# Patient Record
Sex: MALE | Race: WHITE | NOT HISPANIC OR LATINO | Employment: OTHER | URBAN - METROPOLITAN AREA
[De-identification: names, ages, dates, MRNs, and addresses within clinical notes are randomized per-mention and may not be internally consistent; named-entity substitution may affect disease eponyms.]

---

## 2017-01-02 ENCOUNTER — HOSPITAL ENCOUNTER (OUTPATIENT)
Dept: SPEECH THERAPY | Facility: CLINIC | Age: 67
Setting detail: THERAPIES SERIES
End: 2017-01-02
Attending: PHYSICIAN ASSISTANT
Payer: MEDICARE

## 2017-01-02 PROCEDURE — 40000211 ZZHC STATISTIC SLP  DEPARTMENT VISIT: Performed by: SPEECH-LANGUAGE PATHOLOGIST

## 2017-01-02 PROCEDURE — 97532 ZZHC SP COGNITIVE SKILLS EA 15 MIN: CPT | Mod: GN | Performed by: SPEECH-LANGUAGE PATHOLOGIST

## 2017-01-06 ENCOUNTER — HOSPITAL ENCOUNTER (OUTPATIENT)
Dept: OCCUPATIONAL THERAPY | Facility: CLINIC | Age: 67
Setting detail: THERAPIES SERIES
End: 2017-01-06
Attending: PHYSICIAN ASSISTANT
Payer: MEDICARE

## 2017-01-06 PROCEDURE — 97530 THERAPEUTIC ACTIVITIES: CPT | Mod: GO | Performed by: OCCUPATIONAL THERAPIST

## 2017-01-06 PROCEDURE — 40000125 ZZHC STATISTIC OT OUTPT VISIT: Performed by: OCCUPATIONAL THERAPIST

## 2017-01-09 ENCOUNTER — HOSPITAL ENCOUNTER (OUTPATIENT)
Dept: OCCUPATIONAL THERAPY | Facility: CLINIC | Age: 67
Setting detail: THERAPIES SERIES
End: 2017-01-09
Attending: PHYSICIAN ASSISTANT
Payer: MEDICARE

## 2017-01-09 ENCOUNTER — HOSPITAL ENCOUNTER (OUTPATIENT)
Dept: SPEECH THERAPY | Facility: CLINIC | Age: 67
Setting detail: THERAPIES SERIES
End: 2017-01-09
Attending: PHYSICIAN ASSISTANT
Payer: MEDICARE

## 2017-01-09 PROCEDURE — 97530 THERAPEUTIC ACTIVITIES: CPT | Mod: GO | Performed by: OCCUPATIONAL THERAPIST

## 2017-01-09 PROCEDURE — 40000125 ZZHC STATISTIC OT OUTPT VISIT: Performed by: OCCUPATIONAL THERAPIST

## 2017-01-09 PROCEDURE — 40000211 ZZHC STATISTIC SLP  DEPARTMENT VISIT: Performed by: SPEECH-LANGUAGE PATHOLOGIST

## 2017-01-09 PROCEDURE — 97532 ZZHC SP COGNITIVE SKILLS EA 15 MIN: CPT | Mod: GN,59 | Performed by: SPEECH-LANGUAGE PATHOLOGIST

## 2017-01-11 ENCOUNTER — HOSPITAL ENCOUNTER (OUTPATIENT)
Dept: SPEECH THERAPY | Facility: CLINIC | Age: 67
Setting detail: THERAPIES SERIES
End: 2017-01-11
Attending: PHYSICIAN ASSISTANT
Payer: MEDICARE

## 2017-01-11 PROCEDURE — 40000211 ZZHC STATISTIC SLP  DEPARTMENT VISIT: Performed by: SPEECH-LANGUAGE PATHOLOGIST

## 2017-01-11 PROCEDURE — 97532 ZZHC SP COGNITIVE SKILLS EA 15 MIN: CPT | Mod: GN | Performed by: SPEECH-LANGUAGE PATHOLOGIST

## 2017-01-16 ENCOUNTER — HOSPITAL ENCOUNTER (OUTPATIENT)
Dept: SPEECH THERAPY | Facility: CLINIC | Age: 67
Setting detail: THERAPIES SERIES
End: 2017-01-16
Attending: PHYSICIAN ASSISTANT
Payer: MEDICARE

## 2017-01-16 PROCEDURE — 97532 ZZHC SP COGNITIVE SKILLS EA 15 MIN: CPT | Mod: GN | Performed by: SPEECH-LANGUAGE PATHOLOGIST

## 2017-01-16 PROCEDURE — 40000211 ZZHC STATISTIC SLP  DEPARTMENT VISIT: Performed by: SPEECH-LANGUAGE PATHOLOGIST

## 2017-01-17 ENCOUNTER — HOSPITAL ENCOUNTER (OUTPATIENT)
Dept: OCCUPATIONAL THERAPY | Facility: CLINIC | Age: 67
Setting detail: THERAPIES SERIES
End: 2017-01-17
Attending: PHYSICIAN ASSISTANT
Payer: MEDICARE

## 2017-01-17 ENCOUNTER — HOSPITAL ENCOUNTER (OUTPATIENT)
Dept: SPEECH THERAPY | Facility: CLINIC | Age: 67
Setting detail: THERAPIES SERIES
End: 2017-01-17
Attending: PHYSICIAN ASSISTANT
Payer: MEDICARE

## 2017-01-17 PROCEDURE — 40000125 ZZHC STATISTIC OT OUTPT VISIT: Performed by: OCCUPATIONAL THERAPIST

## 2017-01-17 PROCEDURE — 40000211 ZZHC STATISTIC SLP  DEPARTMENT VISIT: Performed by: SPEECH-LANGUAGE PATHOLOGIST

## 2017-01-17 PROCEDURE — 97535 SELF CARE MNGMENT TRAINING: CPT | Mod: GO | Performed by: OCCUPATIONAL THERAPIST

## 2017-01-17 PROCEDURE — 97532 ZZHC SP COGNITIVE SKILLS EA 15 MIN: CPT | Mod: GN | Performed by: SPEECH-LANGUAGE PATHOLOGIST

## 2017-01-17 PROCEDURE — 97112 NEUROMUSCULAR REEDUCATION: CPT | Mod: GO | Performed by: OCCUPATIONAL THERAPIST

## 2017-01-23 ENCOUNTER — HOSPITAL ENCOUNTER (OUTPATIENT)
Dept: SPEECH THERAPY | Facility: CLINIC | Age: 67
Setting detail: THERAPIES SERIES
End: 2017-01-23
Attending: PHYSICIAN ASSISTANT
Payer: MEDICARE

## 2017-01-23 PROCEDURE — 97532 ZZHC SP COGNITIVE SKILLS EA 15 MIN: CPT | Mod: GN | Performed by: SPEECH-LANGUAGE PATHOLOGIST

## 2017-01-23 PROCEDURE — 40000211 ZZHC STATISTIC SLP  DEPARTMENT VISIT: Performed by: SPEECH-LANGUAGE PATHOLOGIST

## 2017-01-23 NOTE — ADDENDUM NOTE
Encounter addended by: Shania Miguel OTR on: 1/23/2017  9:45 AM<BR>     Documentation filed: Clinical Notes, Inpatient Document Flowsheet

## 2017-01-23 NOTE — PROGRESS NOTES
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  Loses track of which step he is on. during interruptions, hard to think his way through prior known sequences for processing questions with customers. At time has cognitive effort reaching 8/10. Patient reports his handwriting legibility hasn't ever been great, but it is improved since the onset of the stroke.      Objective Measurements:     Objective Measure: Handwriting Speed and legibility     Details: getting 109 characters in 2 min sample, for 55 characters per minute (decreased from 76 wpm previously).   Improved legibility and improved legibility compared to last sample and thus, patient may have taken more time to be more accurate with this sample      Objective Measure: CAM/Errands   Details: Re-tested select areas: patient scored WFL with multiple digit math skills and complex concrete problem solving skills (both improved from MODERATE deficits, problem solving task required 1 general cue - then able to correct which still allows WFLs for scoring).     Completes tasks in 17 minutes, 3 min less than estimate; Does well with recalling details inside of task list, tracking/checking off items as they are completed, seeing details of original vs copy management, movies times/location using new iPad helen, and applied use of  timer on cell phone to recall of start time to plan for time based meeting. Pt threw away his checklist copy in the pediatric gym (missed pathfinding for waiting room, impulsive, walked quickly.) Had good structure for sequence, grouped items by location zone, and used direct order 1-11.   Pt able to reflect on his single error and his need to slow down during unfamiliar tasks and ask clarifying questions as needed.           Objective Measure: B FMC skills via 9HPT   Details: R: 28, 25 sec. (was 28 and 24 sec. at evaluation); L: 22 sec. (was 29 and 28 seconds at evaluation)        Goals:     Goal Identifier FMC   Goal Description Patient will demonstrate increased  B hand FMC (by 5 seconds) each for increased ease, accuracy with ADL/IADLs such as keyboard entry, handwriting legibility, opening containers, pand in hand shifting of coins from palm to finger tip. (1/17: met for L hand, not for R hand)   Target Date 02/26/17   Date Met      Progress:Per above, goal continued     Goal Identifier Handwriting/Keyboarding   Goal Description Pt will demonstrate increased written legibility to 90% and fluency to 80 characters per minute to support family and vocational communication for 10 minute intervals, <3/10 effort.   Target Date 02/26/17   Date Met      Progress: Per above, goal continued.     Goal Identifier Visual   Goal Description Pt will demonstrate improved visual efficiency with scanning and tolerance of reading tasks to one hour interval with <3/10 effort.   Target Date 02/26/17   Date Met      Progress:  Pt tolerating 2 hour intervals of work with 6-8/10 cognitive effort, visual effort 3/10.     Goal Identifier CAM, IADL   Goal Description Patient and family to verbalize understanding of  IADL screening, Cognitive Assessment of MN results and identify 3 strategies to increase patient's efficiency and safety in the home and community settings.   Target Date 02/26/17   Date Met   1/17/17   Progress: GOAL MET      Goal Identifier Errands   Goal Description Pt to demonstrate improved executive functioning by gathering a series of 7-10 errand tasks into sequence, planning order by space, energy conservation, temporal completion and consideration of how steps affect each other, with 90% accuracy for sequencing and follow through.   Target Date 02/26/17   Date Met      Progress: Per above, Errands/CAM       Goal Identifier GOAL ADDED--  Moderate problem solving, executive functioning    Goal Description Patient will demonstrate the ability to complete divided attention during alternating tasks of moderately complex money and office management tasks with 90% accuracy for increased attention to detail and problem solving skills to resume finances at home and manage money in the community.   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  Pt has difficulty with shifting attention between tasks and between mathematic and office functions if completing manually. 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 Completing 10-12 step errands task is challenging for him due to shifting between processes and considering between variables during task completion.  Adapted well using timer and movie search apps on his phone.    Plan:  Continue therapy per current plan of care.    Discharge:  No

## 2017-01-23 NOTE — ADDENDUM NOTE
Encounter addended by: Shania Miguel OTR on: 1/23/2017  5:10 PM<BR>     Documentation filed: Notes Section

## 2017-01-24 ENCOUNTER — HOSPITAL ENCOUNTER (OUTPATIENT)
Dept: OCCUPATIONAL THERAPY | Facility: CLINIC | Age: 67
Setting detail: THERAPIES SERIES
End: 2017-01-24
Attending: PHYSICIAN ASSISTANT
Payer: MEDICARE

## 2017-01-24 PROCEDURE — 97530 THERAPEUTIC ACTIVITIES: CPT | Mod: GO | Performed by: OCCUPATIONAL THERAPIST

## 2017-01-24 PROCEDURE — 40000125 ZZHC STATISTIC OT OUTPT VISIT: Performed by: OCCUPATIONAL THERAPIST

## 2017-01-25 ENCOUNTER — HOSPITAL ENCOUNTER (OUTPATIENT)
Dept: SPEECH THERAPY | Facility: CLINIC | Age: 67
Setting detail: THERAPIES SERIES
End: 2017-01-25
Attending: PHYSICIAN ASSISTANT
Payer: MEDICARE

## 2017-01-25 PROCEDURE — 97532 ZZHC SP COGNITIVE SKILLS EA 15 MIN: CPT | Mod: GN | Performed by: SPEECH-LANGUAGE PATHOLOGIST

## 2017-01-25 PROCEDURE — 40000211 ZZHC STATISTIC SLP  DEPARTMENT VISIT: Performed by: SPEECH-LANGUAGE PATHOLOGIST

## 2017-01-30 ENCOUNTER — HOSPITAL ENCOUNTER (OUTPATIENT)
Dept: SPEECH THERAPY | Facility: CLINIC | Age: 67
Setting detail: THERAPIES SERIES
End: 2017-01-30
Attending: PHYSICIAN ASSISTANT
Payer: MEDICARE

## 2017-01-30 PROCEDURE — 40000211 ZZHC STATISTIC SLP  DEPARTMENT VISIT: Performed by: SPEECH-LANGUAGE PATHOLOGIST

## 2017-01-30 PROCEDURE — 97532 ZZHC SP COGNITIVE SKILLS EA 15 MIN: CPT | Mod: GN | Performed by: SPEECH-LANGUAGE PATHOLOGIST

## 2017-02-01 ENCOUNTER — HOSPITAL ENCOUNTER (OUTPATIENT)
Dept: SPEECH THERAPY | Facility: CLINIC | Age: 67
Setting detail: THERAPIES SERIES
End: 2017-02-01
Attending: PHYSICIAN ASSISTANT
Payer: MEDICARE

## 2017-02-01 PROCEDURE — 97532 ZZHC SP COGNITIVE SKILLS EA 15 MIN: CPT | Mod: GN | Performed by: SPEECH-LANGUAGE PATHOLOGIST

## 2017-02-01 PROCEDURE — G9162 LANG EXPRESS CURRENT STATUS: HCPCS | Mod: GN,CJ | Performed by: SPEECH-LANGUAGE PATHOLOGIST

## 2017-02-01 PROCEDURE — 40000211 ZZHC STATISTIC SLP  DEPARTMENT VISIT: Performed by: SPEECH-LANGUAGE PATHOLOGIST

## 2017-02-01 PROCEDURE — G9163 LANG EXPRESS GOAL STATUS: HCPCS | Mod: GN,CI | Performed by: SPEECH-LANGUAGE PATHOLOGIST

## 2017-02-01 NOTE — PROGRESS NOTES
Outpatient Speech Language Pathology Progress Note/Medicare 10th Visit Note      Patient: Bert Ngo  : 1950    Beginning/End Dates of Reporting Period:   to 2017    Referring Provider: Dr. Chuy Moore MD    Therapy Diagnosis: Moderate expressive/receptive aphasia. Moderate cognitive-linguistic impairments.     Client Self Report: Mr. Ngo is a 66 y.o. Male with a medical history significant for at Lt MCA stroke and Rt infarct occuring on 2016. Please see the initial speech-language evaluation dated 2016 for further medical history. At this time Mr. Ngo has completed 9 treatment sessions focused on cognitive-linguistic impairments. SLP suspects expressive/receptive aphasia worsened as a results of his attention ane memory deficits, thus, treatment sessions have directly targeted cognitive-linguistic skills.     Objective Measurements:           Goals:  Goal Identifier LTG   Goal Description Patient will improve his cognitive-linguistic function for daily needs at home and in the work place with use of compensatory strategies as neded and at least 90% accuracy.    Target Date 17   Date Met      Progress: Goal not met. Pt continues to demonstrate a need for use of strategies, currently achieving 80-85% accuracy.  SLP recommends follow up check in session to assess skill function following 1 month break without direct intervention.      Goal Identifier STG: Cognition/Memory strategies   Goal Description Patient will verbalized and demonstrate understanding of recommended memory strategies for use at home and in the occupational setting with 80% accuracy.    Target Date 17   Date Met   2017   Progress:Goal met.      Goal Identifier STG: Cognition/Memory   Goal Description Patient will recall verbal and non-verbal information needed for daily living and occpational needs presented immediately and after a delay of at least 10 minutes with 85% accuracy and  moderate cues.   Target Date 02/07/17   Date Met   2/1/2017   Progress:Goal met.      Goal Identifier STG: Cognition/Reasoning   Goal Description Patient will complete moderate level verbal and non-verbal reasoning including deduction, logic and organization needed for daily living and occupational communication needs with 85% accuracy and moderate assistance.    Target Date 02/07/17   Date Met   2/1/2017   Progress:Goal met.      Goal Identifier STG:  Attention/Concentration   Goal Description Patient will complete demonstrate sustained and divided attention for daily living and occupational needs and 80% accuracy provided minimal cues.    Target Date 02/07/17   Date Met  2/1/2017   Progress:Goal met.      **Language goals not directly targeted due to influence of cognitive-linguistic impairments on expressive and receptive language skills.     Goal Identifier LTG   Goal Description Patient will improve his expressive and receptive language for daily and occupational needs at home and in the communicty with use of compensatory strategies as needed and at least 95% accuracy.    Target Date 03/02/17   Date Met      Progress: Goal not met. Pt is currently achieving 80-90% with use of compensatory strategies provided cueing. SLP recommends follow up check in session to assess skill function following 1 month break without direct intervention.      Goal Identifier STG1: Language/Verbal Expression    Goal Description Patient will complete complex level verbal expression tasks for naming, describing and defining needed for daily communication with 85% accuracy and moderate assistance.    Target Date 02/07/17   Date Met   2/1/2017   Progress:Goal met.      Goal Identifier STG2: Language/ Reading Comprehension   Goal Description Patient will complete complex level readign comprehension tasks for improved comprehension of written inormation needed for daily living and emergency needs and 90% accuracy provided minimal  assistance.    Target Date 02/07/17   Date Met   2/1/2017   Progress:Goal met.      Goal Identifier STG3: Language/Auditory comprehension   Goal Description Patient will complete complex level auditory comprehension tasks for improved comprehension of auditory information needed for daily living and occupational needs and 90% accuracy provided minimal assitance.    Target Date 02/07/17   Date Met   2/1/2017   Progress:Goal met.      Goal Identifier STG4: Langauge/ Written Expression    Goal Description Patient will complete complex level written expression tasks for improved communication for daily and occupational needs and 90% accuracy provided minimal cues.    Target Date 02/07/17   Date Met   2/1/2017   Progress:Goal met.      Goal Identifier STG-Home Exercise Program (HEP)   Goal Description Patient will complete a home exercise program geared towards these above goals with 90% accuracy provided 0-min cues from family and friends.    Target Date  (Ongoing)   Date Met      Progress:     Progress Toward Goals:    Progress this reporting period: Mr. Ngo has met all of his short term goals during this treatment period. SLP recommends Mr. Ngo return for 1 follow up treatment session in 1 month to ensure skill maintenance and generalization to his occupational setting.     Plan:  Continue therapy per current plan of care.     Discharge:  No    Thank you for referring Bert Ngo to outpatient therapy at [facility] ([place] in [place].  Please call Angie Andrea MA, SLP-CCC at [phone] or email [email or web address] with any questions or concerns.      Angie Andrea M.A., SLP-CCC  Speech-Language Pathologist

## 2017-02-06 ENCOUNTER — HOSPITAL ENCOUNTER (OUTPATIENT)
Dept: OCCUPATIONAL THERAPY | Facility: CLINIC | Age: 67
Setting detail: THERAPIES SERIES
End: 2017-02-06
Attending: PHYSICIAN ASSISTANT
Payer: MEDICARE

## 2017-02-06 PROCEDURE — 40000125 ZZHC STATISTIC OT OUTPT VISIT: Performed by: OCCUPATIONAL THERAPIST

## 2017-02-06 PROCEDURE — 97530 THERAPEUTIC ACTIVITIES: CPT | Mod: GO | Performed by: OCCUPATIONAL THERAPIST

## 2017-03-01 NOTE — PROGRESS NOTES
[facility]      OUTPATIENT SPEECH LANGUAGE PATHOLOGY  PLAN OF TREATMENT FOR OUTPATIENT REHABILITATION    Patient's Last Name, First Name, M.I.                YOB: 1950  Bert Ngo                        Provider's Name  [facility] Medical Record No.  [id number]                               Onset Date: 11/7/2016   Start of Care Date: 12/2/2016   Type:     ___PT   ___OT   _X_SLP Medical Diagnosis: Left MCA CVA and right infarct.                       SLP Diagnosis: Moderate expressive/receptive aphasia. Moderate cognitive-linguistic impairments      _________________________________________________________________________________  Plan of Treatment:    Frequency/Duration: 1/week 6-8 weeks pending progress.      Goals:  Goal Identifier LTG   Goal Description Patient will improve his cognitive-linguistic function for daily needs at home and in the work place with use of compensatory strategies as neded and at least 90% accuracy.    Target Date 03/02/17   Date Met      Progress:     Goal Identifier STG: Cognition/Memory strategies   Goal Description Patient will verbalized and demonstrate understanding of recommended memory strategies for use at home and in the occupational setting with 80% accuracy.    Target Date 02/07/17   Date Met      Progress:     Goal Identifier STG: Cognition/Memory   Goal Description Patient will recall verbal and non-verbal information needed for daily living and occpational needs presented immediately and after a delay of at least 10 minutes with 85% accuracy and moderate cues.   Target Date 02/07/17   Date Met      Progress:     Goal Identifier STG: Cognition/Reasoning   Goal Description Patient will complete moderate level verbal and non-verbal reasoning including deduction, logic and organization needed for daily living and occupational  communication needs with 85% accuracy and moderate assistance.    Target Date 02/07/17   Date Met      Progress:     Goal Identifier STG:  Attention/Concentration   Goal Description Patient will complete demonstrate sustained and divided attention for daily living and occupational needs and 80% accuracy provided minimal cues.    Target Date 02/07/17   Date Met      Progress:     Goal Identifier LTG   Goal Description Patient will improve his expressive and receptive language for daily and occupational needs at home and in the communicty with use of compensatory strategies as needed and at least 95% accuracy.    Target Date 03/02/17   Date Met      Progress:     Goal Identifier STG1: Language/Verbal Expression    Goal Description Patient will complete complex level verbal expression tasks for naming, describing and defining needed for daily communication with 85% accuracy and moderate assistance.    Target Date 02/07/17   Date Met      Progress:     Goal Identifier STG2: Language/ Reading Comprehension   Goal Description Patient will complete complex level readign comprehension tasks for improved comprehension of written inormation needed for daily living and emergency needs and 90% accuracy provided minimal assistance.    Target Date 02/07/17   Date Met      Progress:     Goal Identifier STG3: Language/Auditory comprehension   Goal Description Patient will complete complex level auditory comprehension tasks for improved comprehension of auditory information needed for daily living and occupational needs and 90% accuracy provided minimal assitance.    Target Date 02/07/17   Date Met      Progress:     Goal Identifier STG4: Langauge/ Written Expression    Goal Description Patient will complete complex level written expression tasks for improved communication for daily and occupational needs and 90% accuracy provided minimal cues.    Target Date 02/07/17   Date Met      Progress:     Goal Identifier STG-Home Exercise  Program (HEP)   Goal Description Patient will complete a home exercise program geared towards these above goals with 90% accuracy provided 0-min cues from family and friends.    Target Date  (Ongoing)   Date Met      Progress:     Please see progress note in EPIC dated 2/1/2017 for most recent progress update.       Certification date from 3/1/2017 to 5/30/2017.    Angie Andrea MA CCC-SLP              I CERTIFY THE NEED FOR THESE SERVICES FURNISHED UNDER        THIS PLAN OF TREATMENT AND WHILE UNDER MY CARE .             Physician Signature               Date    X_____________________________________________________                      Referring Provider: Diony Roberts

## 2017-03-14 ENCOUNTER — HOSPITAL ENCOUNTER (OUTPATIENT)
Dept: SPEECH THERAPY | Facility: CLINIC | Age: 67
Setting detail: THERAPIES SERIES
End: 2017-03-14
Attending: PHYSICIAN ASSISTANT
Payer: MEDICARE

## 2017-03-14 PROCEDURE — 97532 ZZHC SP COGNITIVE SKILLS EA 15 MIN: CPT | Mod: GN | Performed by: SPEECH-LANGUAGE PATHOLOGIST

## 2017-03-14 PROCEDURE — G9163 LANG EXPRESS GOAL STATUS: HCPCS | Mod: GN,CI | Performed by: SPEECH-LANGUAGE PATHOLOGIST

## 2017-03-14 PROCEDURE — 40000211 ZZHC STATISTIC SLP  DEPARTMENT VISIT: Performed by: SPEECH-LANGUAGE PATHOLOGIST

## 2017-03-14 PROCEDURE — G9164 LANG EXPRESS D/C STATUS: HCPCS | Mod: GN,CI | Performed by: SPEECH-LANGUAGE PATHOLOGIST

## 2017-03-14 NOTE — PROGRESS NOTES
Outpatient Speech Language Pathology Discharge Note     Patient: Bert Ngo  : 1950    Beginning/End Dates of Reporting Period:  2017 to 3/14/2017    Referring Provider: Dr. Chuy Moore MD      Therapy Diagnosis: Moderate expressive/receptive aphasia, moderate cognitive-linguistic impairments.     Client Self Report: Mr. Ngo is a 66 y.o. Male with a medical history significant for a L MCA stroke and R infarct occurring on 2016. Please see the initial speech language evaluation report in [facility] dated 2016 for further medical history. During this treatment period Pt completed a 1 month therapy hold to assess for skill maintenance and generalization without direct intervention. On 3/14/2017 Mr. Ngo compelted a follow up therapy session, at this time all goals have been met and he is appropriate for d/c.     Objective Measurements:                                            Goals:  Goal Identifier LTG   Goal Description Patient will improve his cognitive-linguistic function for daily needs at home and in the work place with use of compensatory strategies as neded and at least 90% accuracy.    Target Date 17   Date Met   3/14/2017   Progress: Goal met.     Goal Identifier LTG   Goal Description Patient will improve his expressive and receptive language for daily and occupational needs at home and in the communicty with use of compensatory strategies as needed and at least 95% accuracy.    Target Date 17   Date Met   3/14/2017   Progress:Goal met.     Goal Identifier STG1: Language/Verbal Expression    Goal Description Patient will complete complex level verbal expression tasks for naming, describing and defining needed for daily communication with 85% accuracy and moderate assistance.    Target Date 17   Date Met   3/14/2017   Progress:Goal met.     Goal Identifier STG2: Language/ Reading Comprehension   Goal Description Patient will complete complex level readign  comprehension tasks for improved comprehension of written inormation needed for daily living and emergency needs and 90% accuracy provided minimal assistance.    Target Date 02/07/17   Date Met   3/14/2017   Progress:Goal met.     Goal Identifier STG3: Language/Auditory comprehension   Goal Description Patient will complete complex level auditory comprehension tasks for improved comprehension of auditory information needed for daily living and occupational needs and 90% accuracy provided minimal assitance.    Target Date 02/07/17   Date Met   3/14/2017   Progress:Goal met.     Goal Identifier STG4: Langauge/ Written Expression    Goal Description Patient will complete complex level written expression tasks for improved communication for daily and occupational needs and 90% accuracy provided minimal cues.    Target Date 02/07/17   Date Met   3/14/2017   Progress:Goal met.     Goal Identifier STG-Home Exercise Program (HEP)   Goal Description Patient will complete a home exercise program geared towards these above goals with 90% accuracy provided 0-min cues from family and friends.    Target Date  (Ongoing)   Date Met      Progress:     Progress Toward Goals:   Progress this reporting period: Pt has met all goals and maintained adequate skill function without direct intervention. Pt is appropriate for d/c at this time.      Plan:  Discharge from therapy.    Discharge: Yes     Reason for Discharge: Patient has met all goals.    Discharge Plan: Patient to continue home program.    Thank you for referring Bert Ngo to outpatient therapy at [facility] in [place].  Please call Angie Andrea MA, SLP-CCC at [phone] or email [email or web address] with any questions or concerns.      Angie Andrea M.A., SLP-CCC  Speech-Language Pathologist

## 2017-04-12 NOTE — PROGRESS NOTES
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  Pt reports that his homework is still 6/10 effort, but does get 100% accuate, pleased.  Can work for 4 hour intervals at a time, takes longer break before returning to work tasks.    Objective Measurements:     Objective Measure: (P) Handwriting Speed and legibility   Details: (P) Per 17--getting 109 characters in 2 min sample, for 55 characters per minute (decreased from 76 wpm previously).   Improved legibility and improved legibility compared to last sample and thus, patient may have taken more time to be more accurate with this sample      Objective Measure: (P) CAM/Errands   Details: (P) Per 17--Re-tested select higher areas: patient scored WFL with multiple digit math skills and complex concrete problem solving skills (both improved from MODERATE deficits, problem solving task required 1 general cue - then able to correct which still allows WFLs for scoring)     Objective Measure: (P) Errands   Details: (P) 10.      Objective Measure: (P) B FMC skills via 9HPT   Details: (P) Per 17--R: 28, 25 sec. (was 28 and 24 sec. at evaluation); L: 22 sec. (was 29 and 28 seconds at evaluation)        Goals:     Goal Identifier (P) FMC   Goal Description (P) Patient will demonstrate increased  B hand FMC (by 5 seconds) each for increased ease, accuracy with ADL/IADLs such as keyboard entry, handwriting legibility, opening containers,  pand in hand shifting of coins from palm to finger tip. (1/17: met for L hand, not for R hand)   Target Date (P) 02/26/17   Date Met      Progress: At [facility].     Goal Identifier (P) Handwriting/Keyboarding   Goal Description (P) Pt will demonstrate increased written legibility to 90% and fluency to 80 characters per minute to support family and vocational communication for 10 minute intervals, <3/10 effort.   Target Date (P) 02/26/17   Date Met      Progress: Legibility goal met.     Goal Identifier (P) Visual   Goal Description (P) Pt will demonstrate improved visual efficiency with scanning and tolerance of reading tasks to one hour interval with <3/10 effort.   Target Date (P) 02/26/17   Date Met      Progress:     Goal Identifier (P) CAM, IADL   Goal Description (P) Patient and family to verbalize understanding of  IADL screening, Cognitive Assessment of [place] results and identify 3 strategies to increase patient's efficiency and safety in the home and community settings.   Target Date (P) 02/26/17   Date Met  (P) 01/17/17   Progress:     Goal Identifier (P) Errands   Goal Description (P) Pt to demonstrate improved executive functioning by gathering a series of 7-10 errand tasks into sequence, planning order by space, energy conservation, temporal completion and consideration of how steps affect each other, with 90% accuracy for sequencing and follow through.   Target Date (P) 02/26/17   Date Met      Progress:Further progress unknown.         Progress Toward Goals:   Progress this reporting period: Pt failed to return for last 3 vists scheduled for OT, but had been working at tolerating full work days.  OT presumes this goal was met and pt chose not to return.    Plan:  Discharge from therapy after 12 visits.    Reason for Discharge: Patient chooses to discontinue therapy.  Patient has failed to schedule further appointments.    Equipment Issued: red putty, red therapy band.    Discharge Plan: Patient to continue  home program.

## 2017-04-12 NOTE — ADDENDUM NOTE
Encounter addended by: Shania Miguel, OTR on: 4/12/2017  1:48 PM<BR>     Actions taken: Flowsheet data copied forward, Sign clinical note, Flowsheet accepted, Episode resolved

## 2023-11-13 ENCOUNTER — HOSPITAL ENCOUNTER (EMERGENCY)
Facility: CLINIC | Age: 73
Discharge: HOME | End: 2023-11-13
Attending: EMERGENCY MEDICINE | Admitting: EMERGENCY MEDICINE
Payer: COMMERCIAL

## 2023-11-13 ENCOUNTER — APPOINTMENT (OUTPATIENT)
Dept: GENERAL RADIOLOGY | Facility: CLINIC | Age: 73
End: 2023-11-13
Attending: PHYSICIAN ASSISTANT
Payer: COMMERCIAL

## 2023-11-13 ENCOUNTER — APPOINTMENT (OUTPATIENT)
Dept: CT IMAGING | Facility: CLINIC | Age: 73
End: 2023-11-13
Attending: EMERGENCY MEDICINE
Payer: COMMERCIAL

## 2023-11-13 VITALS
RESPIRATION RATE: 17 BRPM | SYSTOLIC BLOOD PRESSURE: 150 MMHG | DIASTOLIC BLOOD PRESSURE: 71 MMHG | HEART RATE: 68 BPM | OXYGEN SATURATION: 100 % | TEMPERATURE: 97.5 F

## 2023-11-13 DIAGNOSIS — S32.010A COMPRESSION FRACTURE OF L1 VERTEBRA, INITIAL ENCOUNTER (H): ICD-10-CM

## 2023-11-13 DIAGNOSIS — S32.010A CLOSED COMPRESSION FRACTURE OF BODY OF L1 VERTEBRA (H): ICD-10-CM
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
## 2023-11-13 LAB
ANION GAP SERPL CALCULATED.3IONS-SCNC: 10 MMOL/L (ref 7–15)
BUN SERPL-MCNC: 16.3 MG/DL (ref 8–23)
CALCIUM SERPL-MCNC: 10 MG/DL (ref 8.8–10.2)
CHLORIDE SERPL-SCNC: 96 MMOL/L (ref 98–107)
CREAT SERPL-MCNC: 1.06 MG/DL (ref 0.67–1.17)
DEPRECATED HCO3 PLAS-SCNC: 29 MMOL/L (ref 22–29)
EGFRCR SERPLBLD CKD-EPI 2021: 73 ML/MIN/1.73M2
ERYTHROCYTE [DISTWIDTH] IN BLOOD BY AUTOMATED COUNT: 13.2 % (ref 10–15)
GLUCOSE SERPL-MCNC: 104 MG/DL (ref 70–99)
HCT VFR BLD AUTO: 45.1 % (ref 40–53)
HGB BLD-MCNC: 15.1 G/DL (ref 13.3–17.7)
HOLD SPECIMEN: NORMAL
HOLD SPECIMEN: NORMAL
MCH RBC QN AUTO: 30.4 PG (ref 26.5–33)
MCHC RBC AUTO-ENTMCNC: 33.5 G/DL (ref 31.5–36.5)
MCV RBC AUTO: 91 FL (ref 78–100)
PLATELET # BLD AUTO: 259 10E3/UL (ref 150–450)
POTASSIUM SERPL-SCNC: 3.2 MMOL/L (ref 3.4–5.3)
RBC # BLD AUTO: 4.96 10E6/UL (ref 4.4–5.9)
SODIUM SERPL-SCNC: 135 MMOL/L (ref 135–145)
WBC # BLD AUTO: 7.8 10E3/UL (ref 4–11)

## 2023-11-13 PROCEDURE — 36415 COLL VENOUS BLD VENIPUNCTURE: CPT | Performed by: EMERGENCY MEDICINE

## 2023-11-13 PROCEDURE — 72100 X-RAY EXAM L-S SPINE 2/3 VWS: CPT

## 2023-11-13 PROCEDURE — 96376 TX/PRO/DX INJ SAME DRUG ADON: CPT | Mod: 59

## 2023-11-13 PROCEDURE — L0456 TLSO FLEX TRNK SJ-SS PRE CST: HCPCS

## 2023-11-13 PROCEDURE — 70450 CT HEAD/BRAIN W/O DYE: CPT | Mod: MA

## 2023-11-13 PROCEDURE — 74177 CT ABD & PELVIS W/CONTRAST: CPT | Mod: MA

## 2023-11-13 PROCEDURE — 96374 THER/PROPH/DIAG INJ IV PUSH: CPT | Mod: 59

## 2023-11-13 PROCEDURE — 72125 CT NECK SPINE W/O DYE: CPT | Mod: MA

## 2023-11-13 PROCEDURE — 96375 TX/PRO/DX INJ NEW DRUG ADDON: CPT | Mod: 59

## 2023-11-13 PROCEDURE — 258N000003 HC RX IP 258 OP 636: Performed by: EMERGENCY MEDICINE

## 2023-11-13 PROCEDURE — 999N000104 CT THORACIC SPINE RECONSTRUCTED

## 2023-11-13 PROCEDURE — 96361 HYDRATE IV INFUSION ADD-ON: CPT

## 2023-11-13 PROCEDURE — 250N000011 HC RX IP 250 OP 636: Performed by: EMERGENCY MEDICINE

## 2023-11-13 PROCEDURE — 99285 EMERGENCY DEPT VISIT HI MDM: CPT | Mod: 25

## 2023-11-13 PROCEDURE — 250N000009 HC RX 250: Performed by: EMERGENCY MEDICINE

## 2023-11-13 PROCEDURE — 999N000104 CT LUMBAR SPINE RECONSTRUCTED

## 2023-11-13 PROCEDURE — 85027 COMPLETE CBC AUTOMATED: CPT | Performed by: EMERGENCY MEDICINE

## 2023-11-13 PROCEDURE — 99204 OFFICE O/P NEW MOD 45 MIN: CPT | Performed by: NURSE PRACTITIONER

## 2023-11-13 PROCEDURE — 82374 ASSAY BLOOD CARBON DIOXIDE: CPT | Performed by: EMERGENCY MEDICINE

## 2023-11-13 RX ORDER — OXYCODONE HYDROCHLORIDE 5 MG/1
5 TABLET ORAL EVERY 6 HOURS PRN
Qty: 8 TABLET | Refills: 0 | Status: SHIPPED | OUTPATIENT
Start: 2023-11-13

## 2023-11-13 RX ORDER — LORAZEPAM 2 MG/ML
0.5 INJECTION INTRAMUSCULAR
Status: DISCONTINUED | OUTPATIENT
Start: 2023-11-13 | End: 2023-11-13 | Stop reason: HOSPADM

## 2023-11-13 RX ORDER — IOPAMIDOL 755 MG/ML
81 INJECTION, SOLUTION INTRAVASCULAR ONCE
Status: COMPLETED | OUTPATIENT
Start: 2023-11-13 | End: 2023-11-13

## 2023-11-13 RX ORDER — HYDROMORPHONE HYDROCHLORIDE 1 MG/ML
0.5 INJECTION, SOLUTION INTRAMUSCULAR; INTRAVENOUS; SUBCUTANEOUS
Status: DISCONTINUED | OUTPATIENT
Start: 2023-11-13 | End: 2023-11-13 | Stop reason: HOSPADM

## 2023-11-13 RX ORDER — ONDANSETRON 2 MG/ML
4 INJECTION INTRAMUSCULAR; INTRAVENOUS ONCE
Status: COMPLETED | OUTPATIENT
Start: 2023-11-13 | End: 2023-11-13

## 2023-11-13 RX ADMIN — LORAZEPAM 0.5 MG: 2 INJECTION INTRAMUSCULAR; INTRAVENOUS at 10:15

## 2023-11-13 RX ADMIN — HYDROMORPHONE HYDROCHLORIDE 0.5 MG: 1 INJECTION, SOLUTION INTRAMUSCULAR; INTRAVENOUS; SUBCUTANEOUS at 09:35

## 2023-11-13 RX ADMIN — HYDROMORPHONE HYDROCHLORIDE 0.5 MG: 1 INJECTION, SOLUTION INTRAMUSCULAR; INTRAVENOUS; SUBCUTANEOUS at 10:04

## 2023-11-13 RX ADMIN — ONDANSETRON 4 MG: 2 INJECTION INTRAMUSCULAR; INTRAVENOUS at 09:35

## 2023-11-13 RX ADMIN — IOPAMIDOL 81 ML: 755 INJECTION, SOLUTION INTRAVENOUS at 11:25

## 2023-11-13 RX ADMIN — SODIUM CHLORIDE 1000 ML: 9 INJECTION, SOLUTION INTRAVENOUS at 09:32

## 2023-11-13 RX ADMIN — SODIUM CHLORIDE 65 ML: 9 INJECTION, SOLUTION INTRAVENOUS at 11:24

## 2023-11-13 ASSESSMENT — ACTIVITIES OF DAILY LIVING (ADL)
ADLS_ACUITY_SCORE: 35

## 2023-11-13 NOTE — ED NOTES
Pt becoming extremely anxious. Wife at bedside endorses pt has been going through therapy for PTSD related to past war experiences. Pt on monitors, consolable at this time. Call light within reach. MD aware, will order ativan.
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
## 2024-01-03 ENCOUNTER — ANCILLARY PROCEDURE (OUTPATIENT)
Dept: GENERAL RADIOLOGY | Facility: CLINIC | Age: 74
End: 2024-01-03
Attending: NURSE PRACTITIONER
Payer: COMMERCIAL

## 2024-01-03 ENCOUNTER — OFFICE VISIT (OUTPATIENT)
Dept: NEUROSURGERY | Facility: CLINIC | Age: 74
End: 2024-01-03
Payer: COMMERCIAL

## 2024-01-03 VITALS — HEART RATE: 66 BPM | SYSTOLIC BLOOD PRESSURE: 138 MMHG | DIASTOLIC BLOOD PRESSURE: 81 MMHG | OXYGEN SATURATION: 98 %

## 2024-01-03 DIAGNOSIS — S32.010A COMPRESSION FRACTURE OF L1 VERTEBRA, INITIAL ENCOUNTER (H): Primary | ICD-10-CM

## 2024-01-03 DIAGNOSIS — S32.010A COMPRESSION FRACTURE OF L1 VERTEBRA, INITIAL ENCOUNTER (H): ICD-10-CM

## 2024-01-03 PROCEDURE — 72100 X-RAY EXAM L-S SPINE 2/3 VWS: CPT

## 2024-01-03 PROCEDURE — 99203 OFFICE O/P NEW LOW 30 MIN: CPT | Performed by: NURSE PRACTITIONER

## 2024-01-03 RX ORDER — LISINOPRIL 40 MG/1
40 TABLET ORAL
COMMUNITY
Start: 2023-07-11

## 2024-01-03 RX ORDER — AMLODIPINE BESYLATE 10 MG/1
1 TABLET ORAL DAILY
COMMUNITY
Start: 2023-07-11

## 2024-01-03 RX ORDER — CHOLECALCIFEROL (VITAMIN D3) 10 MCG
TABLET ORAL
COMMUNITY
Start: 2022-06-02

## 2024-01-03 ASSESSMENT — PAIN SCALES - GENERAL: PAINLEVEL: MODERATE PAIN (4)
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
## 2024-01-03 NOTE — PATIENT INSTRUCTIONS
- Continue to wear brace when out of bed.    - Avoid heavy lifting, bending, twisting.   - Avoid strenuous, jostling, jarring activities.   - Follow up in 6 weeks with xray prior.   - Call our  clinic with any new or worsening symptoms.
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
## 2024-01-13 ENCOUNTER — HEALTH MAINTENANCE LETTER (OUTPATIENT)
Age: 74
End: 2024-01-13

## 2024-02-20 ENCOUNTER — ANCILLARY PROCEDURE (OUTPATIENT)
Dept: GENERAL RADIOLOGY | Facility: CLINIC | Age: 74
End: 2024-02-20
Attending: NURSE PRACTITIONER
Payer: COMMERCIAL

## 2024-02-20 DIAGNOSIS — S32.010A COMPRESSION FRACTURE OF L1 VERTEBRA, INITIAL ENCOUNTER (H): ICD-10-CM

## 2024-02-20 PROCEDURE — 72100 X-RAY EXAM L-S SPINE 2/3 VWS: CPT

## 2024-02-21 ENCOUNTER — OFFICE VISIT (OUTPATIENT)
Dept: NEUROSURGERY | Facility: CLINIC | Age: 74
End: 2024-02-21
Payer: COMMERCIAL

## 2024-02-21 VITALS — SYSTOLIC BLOOD PRESSURE: 152 MMHG | DIASTOLIC BLOOD PRESSURE: 73 MMHG | HEART RATE: 64 BPM | OXYGEN SATURATION: 98 %

## 2024-02-21 DIAGNOSIS — S32.010D COMPRESSION FRACTURE OF L1 VERTEBRA WITH ROUTINE HEALING, SUBSEQUENT ENCOUNTER: Primary | ICD-10-CM

## 2024-02-21 PROCEDURE — 99213 OFFICE O/P EST LOW 20 MIN: CPT | Performed by: NURSE PRACTITIONER

## 2024-02-21 ASSESSMENT — PAIN SCALES - GENERAL: PAINLEVEL: MILD PAIN (3)
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
## 2024-02-21 NOTE — PATIENT INSTRUCTIONS
Referral to Orthotics for soft lumbar brace. Okay to wear brace for comfort.     Please follow-up with our clinic  if you develop new  or worsening symptoms.

## 2024-11-27 ENCOUNTER — HOSPITAL ENCOUNTER (OUTPATIENT)
Dept: NUCLEAR MEDICINE | Facility: CLINIC | Age: 74
Setting detail: NUCLEAR MEDICINE
Discharge: HOME | End: 2024-11-27
Attending: INTERNAL MEDICINE
Payer: COMMERCIAL

## 2024-11-27 ENCOUNTER — HOSPITAL ENCOUNTER (OUTPATIENT)
Dept: CARDIOLOGY | Facility: CLINIC | Age: 74
Discharge: HOME | End: 2024-11-27
Attending: INTERNAL MEDICINE
Payer: COMMERCIAL

## 2024-11-27 DIAGNOSIS — R07.9 CHEST PAIN: ICD-10-CM

## 2024-11-27 DIAGNOSIS — Z01.89 ENCOUNTER FOR OTHER SPECIFIED SPECIAL EXAMINATIONS: ICD-10-CM

## 2024-11-27 LAB
CV STRESS MAX HR HE: 97
NUC STRESS EJECTION FRACTION: 59 %
RATE PRESSURE PRODUCT: NORMAL
STRESS ECHO BASELINE DIASTOLIC HE: 82
STRESS ECHO BASELINE HR: 98 BPM
STRESS ECHO BASELINE SYSTOLIC BP: 129
STRESS ECHO CALCULATED PERCENT HR: 67 %
STRESS ECHO LAST STRESS DIASTOLIC BP: 74
STRESS ECHO LAST STRESS SYSTOLIC BP: 141
STRESS ECHO TARGET HR: 144

## 2024-11-27 PROCEDURE — 93017 CV STRESS TEST TRACING ONLY: CPT

## 2024-11-27 PROCEDURE — 343N000001 HC RX 343 MED OP 636

## 2024-11-27 PROCEDURE — 250N000011 HC RX IP 250 OP 636: Performed by: INTERNAL MEDICINE

## 2024-11-27 PROCEDURE — 78452 HT MUSCLE IMAGE SPECT MULT: CPT

## 2024-11-27 PROCEDURE — A9500 TC99M SESTAMIBI: HCPCS

## 2024-11-27 RX ORDER — REGADENOSON 0.08 MG/ML
INJECTION, SOLUTION INTRAVENOUS
Status: COMPLETED
Start: 2024-11-27 | End: 2024-11-27

## 2024-11-27 RX ADMIN — Medication 32.5 MILLICURIE: at 09:50

## 2024-11-27 RX ADMIN — Medication 11 MILLICURIE: at 07:55

## 2024-11-27 RX ADMIN — REGADENOSON 0.4 MG: 0.08 INJECTION, SOLUTION INTRAVENOUS at 10:21

## 2025-01-26 ENCOUNTER — HEALTH MAINTENANCE LETTER (OUTPATIENT)
Age: 75
End: 2025-01-26

## 2025-01-29 ENCOUNTER — HOSPITAL ENCOUNTER (OUTPATIENT)
Dept: CARDIOLOGY | Facility: CLINIC | Age: 75
Discharge: HOME | End: 2025-01-29
Attending: INTERNAL MEDICINE
Payer: COMMERCIAL

## 2025-01-29 DIAGNOSIS — R06.02 SOB (SHORTNESS OF BREATH): ICD-10-CM

## 2025-01-29 LAB — LVEF ECHO: NORMAL

## 2025-01-29 PROCEDURE — 93306 TTE W/DOPPLER COMPLETE: CPT | Mod: 26 | Performed by: INTERNAL MEDICINE

## 2025-01-29 PROCEDURE — 93306 TTE W/DOPPLER COMPLETE: CPT
